# Patient Record
Sex: MALE | Employment: UNEMPLOYED | URBAN - METROPOLITAN AREA
[De-identification: names, ages, dates, MRNs, and addresses within clinical notes are randomized per-mention and may not be internally consistent; named-entity substitution may affect disease eponyms.]

---

## 2018-05-09 DIAGNOSIS — Z77.090 ASBESTOS EXPOSURE: Primary | ICD-10-CM

## 2018-05-09 DIAGNOSIS — R06.02 SOB (SHORTNESS OF BREATH): ICD-10-CM

## 2018-06-07 ENCOUNTER — PROCEDURE VISIT (OUTPATIENT)
Dept: PULMONOLOGY | Facility: CLINIC | Age: 73
End: 2018-06-07

## 2018-06-07 ENCOUNTER — OFFICE VISIT (OUTPATIENT)
Dept: PULMONOLOGY | Facility: CLINIC | Age: 73
End: 2018-06-07

## 2018-06-07 ENCOUNTER — HOSPITAL ENCOUNTER (OUTPATIENT)
Dept: RADIOLOGY | Facility: HOSPITAL | Age: 73
Discharge: HOME | End: 2018-06-07
Attending: INTERNAL MEDICINE

## 2018-06-07 VITALS
OXYGEN SATURATION: 98 % | HEART RATE: 97 BPM | DIASTOLIC BLOOD PRESSURE: 66 MMHG | WEIGHT: 215 LBS | HEIGHT: 72 IN | BODY MASS INDEX: 29.12 KG/M2 | SYSTOLIC BLOOD PRESSURE: 148 MMHG | RESPIRATION RATE: 18 BRPM

## 2018-06-07 DIAGNOSIS — Z77.090 ASBESTOS EXPOSURE: ICD-10-CM

## 2018-06-07 DIAGNOSIS — R06.02 SOB (SHORTNESS OF BREATH): ICD-10-CM

## 2018-06-07 DIAGNOSIS — R94.2 ABNORMAL PULMONARY FUNCTION TEST: ICD-10-CM

## 2018-06-07 DIAGNOSIS — Z77.090 ASBESTOS EXPOSURE: Primary | ICD-10-CM

## 2018-06-07 DIAGNOSIS — R93.89 ABNORMAL CXR (CHEST X-RAY): ICD-10-CM

## 2018-06-07 PROCEDURE — 94060 EVALUATION OF WHEEZING: CPT | Mod: ,,, | Performed by: INTERNAL MEDICINE

## 2018-06-07 PROCEDURE — 99205 OFFICE O/P NEW HI 60 MIN: CPT | Mod: 25,,, | Performed by: INTERNAL MEDICINE

## 2018-06-07 PROCEDURE — 94729 DIFFUSING CAPACITY: CPT | Mod: ,,, | Performed by: INTERNAL MEDICINE

## 2018-06-07 PROCEDURE — 71048 X-RAY EXAM CHEST 4+ VIEWS: CPT | Mod: 26,,, | Performed by: RADIOLOGY

## 2018-06-07 PROCEDURE — 71048 X-RAY EXAM CHEST 4+ VIEWS: CPT | Mod: TC

## 2018-06-07 PROCEDURE — 94726 PLETHYSMOGRAPHY LUNG VOLUMES: CPT | Mod: ,,, | Performed by: INTERNAL MEDICINE

## 2018-06-07 RX ORDER — WITCH HAZEL 50 %
2000 PADS, MEDICATED (EA) TOPICAL
COMMUNITY

## 2018-06-07 RX ORDER — MULTIVITAMIN
TABLET ORAL
COMMUNITY

## 2018-06-07 RX ORDER — VIT C/E/ZN/COPPR/LUTEIN/ZEAXAN 250MG-90MG
CAPSULE ORAL
COMMUNITY

## 2018-06-07 RX ORDER — ALBUTEROL SULFATE 90 UG/1
2 AEROSOL, METERED RESPIRATORY (INHALATION) EVERY 6 HOURS
Qty: 1 INHALER | Refills: 11 | Status: SHIPPED | OUTPATIENT
Start: 2018-06-07 | End: 2018-07-24 | Stop reason: SDUPTHER

## 2018-06-07 RX ORDER — ROSUVASTATIN CALCIUM 10 MG/1
10 TABLET, COATED ORAL
COMMUNITY

## 2018-06-07 NOTE — PROGRESS NOTES
Subjective:       Patient ID: Je Vidal is a 73 y.o. male.    Chief Complaint: He       No chief complaint on file.    HPI\    Dyspnea  Patient complains of shortness of breath. Symptoms occur while getting dressed, with one block walking. Symptoms began 5 years ago, gradually worsening since. Associated symptoms include  dry cough, dyspnea on exertion, shortness of breath and problems sleeping. He denies chest pain, located left chest. He does not have had recent travel. Weight has been stable. Symptoms are exacerbated by any exercise. Symptoms are alleviated by medication(s) (albuterol).     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     Social History Main Topics    Smoking status: Former Smoker     Packs/day: 1.00     Years: 24.00     Start date: 1/1/1960     Quit date: 1/1/1984    Smokeless tobacco: Current User     Types: Chew    Alcohol use Not on file    Drug use: Unknown    Sexual activity: Not on file     Other Topics Concern     Not on file     Social History Narrative    , wife lives in [place] with children ( 3)     Review of Systems   Constitutional: Positive for fatigue.   HENT: Negative.    Respiratory: Positive for shortness of breath and dyspnea on extertion.    Cardiovascular: Negative.    Genitourinary: Negative.    Endocrine: endocrine negative   Musculoskeletal: Positive for arthralgias and gait problem.   Skin: Negative.    Gastrointestinal: Negative.    Psychiatric/Behavioral: Negative.    All other systems reviewed and are negative.      Objective:      Physical Exam   Constitutional: He is oriented to person, place, and time. He appears well-developed and well-nourished.   Walks with a cane   HENT:   Head: Normocephalic and atraumatic.   Mouth/Throat: No oropharyngeal exudate.   Eyes: Conjunctivae are normal. Pupils are equal, round, and reactive to light.   Neck: Neck supple. No JVD present. No tracheal deviation present. No thyromegaly present.   Cardiovascular: Normal rate, regular rhythm and normal heart sounds.    No murmur heard.  Pulmonary/Chest: Effort normal. No respiratory distress. He has decreased breath sounds. He has no wheezes. He has no rhonchi. He has rales in the right lower field and the left lower field. He exhibits no tenderness.   Abdominal: Soft. Bowel sounds are normal.   Musculoskeletal: He exhibits no edema or tenderness.   Decreased range of motion of knees.  Decreased range of motion of  hip .     Lymphadenopathy:     He has no cervical adenopathy.   Neurological: He is alert and oriented to person, place, and time.   Skin: Skin is warm and dry.   Nursing note and vitals reviewed.    Personal Diagnostic Review  Chest x-ray: right lower lung field nodule vs atelectesis  Pulmonary function tests: mild restriction and mild reduction in DLCO    Office Spirometry Results:     No flowsheet data found.  Pulmonary Studies Review 6/7/2018   SpO2 98   Height 72.000   Weight 3440   BMI  (Calculated) 29.2   Predicted Distance 309.63   Predicted Distance Meters (Calculated) 532.43         Assessment:       1. Asbestos exposure    2. Abnormal pulmonary function test    3. SOB (shortness of breath)    4. Abnormal CXR (chest x-ray)        Outpatient Encounter Prescriptions as of 6/7/2018   Medication Sig Dispense Refill    cholecalciferol, vitamin D3, 1,000 unit capsule Take by mouth.      cyanocobalamin 2000 MCG tablet Take 2,000 mcg by mouth.      multivitamin with folic acid 400 mcg Tab Take by mouth.      rosuvastatin (CRESTOR) 10 MG tablet Take 10 mg by mouth.      albuterol 90 mcg/actuation inhaler Inhale 2 puffs into the lungs every 6 (six) hours. 1 Inhaler 11     No facility-administered encounter medications on file as of 6/7/2018.      Orders Placed This Encounter   Procedures    CT Chest Without Contrast     Standing Status:   Future     Standing Expiration Date:   6/7/2019     Order Specific Question:   May the Radiologist modify the order per protocol to meet the clinical needs of the patient?     Answer:   Yes     Plan:       Requested Prescriptions     Signed Prescriptions Disp Refills    albuterol 90 mcg/actuation inhaler 1 Inhaler 11     Sig: Inhale 2 puffs into the lungs every 6 (six) hours.     Asbestos exposure  -     CT Chest Without Contrast; Future; Expected date: 06/07/2018    Abnormal pulmonary function test  -     CT Chest Without Contrast; Future; Expected date: 06/07/2018    SOB (shortness of breath)  -     albuterol 90 mcg/actuation inhaler; Inhale 2 puffs into the lungs every 6 (six) hours.  Dispense: 1 Inhaler; Refill: 11    Abnormal CXR (chest x-ray)  Comments:  Right lower lung fields nipple shadow vs pleural plaque           Follow-up in about 4 weeks (around 7/5/2018) for Review CT/PET day of visit.    MEDICAL DECISION MAKING: Moderate to high complexity.  Overall, the multiple problems listed are of moderate to high severity that may impact quality of life and  activities of daily living. Side effects of medications, treatment plan as well as options and alternatives reviewed and discussed with patient. There was counseling of patient concerning these issues.    Total time spent in face to face counseling and coordination of care - 60  minutes over 50% of time was used in discussion of prognosis, risks, benefits of treatment, instructions and compliance with regimen . Discussion with other physicians or health care providers (DME, NP, pharmacy, respiratory therapy) occurred.

## 2018-06-09 LAB
BRPFT: ABNORMAL
DLCO ADJ PRE: 22.14 ML/(MIN*MMHG) (ref 21.19–35.05)
DLCO PRE: 22.14 ML/(MIN*MMHG) (ref 21.19–35.05)
DLCO SINGLE BREATH LLN: 21.19
DLCO SINGLE BREATH PRE REF: 78.7 %
DLCO SINGLE BREATH REF: 28.12
DLCOC SBVA LLN: 2.65
DLCOC SBVA PRE REF: 102.5 %
DLCOC SBVA REF: 3.73
DLCOC SINGLE BREATH LLN: 21.19
DLCOC SINGLE BREATH PRE REF: 78.7 %
DLCOC SINGLE BREATH REF: 28.12
DLCOVA LLN: 2.65
DLCOVA PRE REF: 102.5 %
DLCOVA PRE: 3.82 ML/(MIN*MMHG*L) (ref 2.65–4.81)
DLCOVA REF: 3.73
DLVAADJ PRE: 3.82 ML/(MIN*MMHG*L) (ref 2.65–4.81)
ERV LLN: 1.06
ERV PRE REF: 33.9 %
ERV PRE: 0.36 L (ref 1.06–1.06)
ERV REF: 1.06
ERVN2 LLN: 1.06
ERVN2 REF: 1.06
FEF 25 75 CHG: -29.3 %
FEF 25 75 LLN: 0.98
FEF 25 75 POST REF: 123.6 %
FEF 25 75 POST: 2.93 L/S (ref 0.98–3.77)
FEF 25 75 PRE REF: 175 %
FEF 25 75 PRE: 4.15 L/S (ref 0.98–3.77)
FEF 25 75 REF: 2.37
FET100 CHG: 231 %
FET100 POST: 7.68 SEC
FET100 PRE: 2.32 SEC
FEV1 CHG: -10.7 %
FEV1 FVC CHG: -12.2 %
FEV1 FVC LLN: 61
FEV1 FVC POST REF: 104.6 %
FEV1 FVC POST: 78.49 % (ref 61.17–88.95)
FEV1 FVC PRE REF: 119.1 %
FEV1 FVC PRE: 89.4 % (ref 61.17–88.95)
FEV1 FVC REF: 75
FEV1 LLN: 2.33
FEV1 POST REF: 81.9 %
FEV1 POST: 2.68 L (ref 2.33–4.22)
FEV1 PRE REF: 91.7 %
FEV1 PRE: 3 L (ref 2.33–4.22)
FEV1 REF: 3.27
FEV6 LLN: 3.4
FEV6 POST REF: 76 %
FEV6 POST: 3.31 L (ref 3.4–5.32)
FEV6 REF: 4.36
FRCN2 LLN: 2.87
FRCN2 REF: 3.86
FRCPL PRE: 3.11 L
FRCPLETH LLN: 2.87
FRCPLETH PREREF: 80.7 %
FRCPLETH REF: 3.86
FVC CHG: 1.7 %
FVC LLN: 3.22
FVC POST REF: 77.8 %
FVC POST: 3.42 L (ref 3.22–5.56)
FVC PRE REF: 76.5 %
FVC PRE: 3.36 L (ref 3.22–5.56)
FVC REF: 4.39
IVC PRE: 3.5 L (ref 3.22–5.56)
IVC SINGLE BREATH LLN: 3.22
IVC SINGLE BREATH PRE REF: 79.8 %
IVC SINGLE BREATH REF: 4.39
MVV LLN: 110
MVV REF: 129
PEF CHG: -34.6 %
PEF LLN: 5.96
PEF POST REF: 54.4 %
PEF POST: 4.58 L/S (ref 5.96–10.86)
PEF PRE REF: 83.2 %
PEF PRE: 6.99 L/S (ref 5.96–10.86)
PEF REF: 8.41
RAW LLN: 3.06
RAW PRE REF: 60 %
RAW PRE: 1.84 CMH2O*S/L (ref 3.06–3.06)
RAW REF: 3.06
RV LLN: 2.12
RV PRE REF: 80 %
RV PRE: 2.24 L (ref 2.12–3.47)
RV REF: 2.8
RVN2 LLN: 2.12
RVN2 REF: 2.8
RVN2TLCN2 LLN: 34
RVN2TLCN2 REF: 43
RVTLC LLN: 34
RVTLC PRE REF: 85.1 %
RVTLC PRE: 36.46 % (ref 33.84–51.8)
RVTLC REF: 43
TLC LLN: 6.39
TLC PRE REF: 81.3 %
TLC PRE: 6.13 L (ref 6.39–8.69)
TLC REF: 7.54
TLCN2 LLN: 6.39
TLCN2 REF: 7.54
VA PRE: 5.8 L (ref 7.39–7.39)
VA SINGLE BREATH LLN: 7.39
VA SINGLE BREATH PRE REF: 78.4 %
VA SINGLE BREATH REF: 7.39
VC LLN: 3.22
VC PRE REF: 88.7 %
VC PRE: 3.9 L (ref 3.22–5.56)
VC REF: 4.39
VCMAXN2 LLN: 3.22
VCMAXN2 REF: 4.39
VTGRAWPRE: 4.03 L

## 2018-07-24 ENCOUNTER — HOSPITAL ENCOUNTER (OUTPATIENT)
Dept: RADIOLOGY | Facility: HOSPITAL | Age: 73
Discharge: HOME | End: 2018-07-24
Attending: INTERNAL MEDICINE

## 2018-07-24 ENCOUNTER — OFFICE VISIT (OUTPATIENT)
Dept: PULMONOLOGY | Facility: CLINIC | Age: 73
End: 2018-07-24

## 2018-07-24 VITALS
OXYGEN SATURATION: 99 % | HEIGHT: 72 IN | DIASTOLIC BLOOD PRESSURE: 72 MMHG | WEIGHT: 211.06 LBS | BODY MASS INDEX: 28.59 KG/M2 | RESPIRATION RATE: 20 BRPM | HEART RATE: 98 BPM | SYSTOLIC BLOOD PRESSURE: 130 MMHG

## 2018-07-24 DIAGNOSIS — R94.2 ABNORMAL PULMONARY FUNCTION TEST: ICD-10-CM

## 2018-07-24 DIAGNOSIS — Z77.090 ASBESTOS EXPOSURE: Primary | ICD-10-CM

## 2018-07-24 DIAGNOSIS — Z77.090 ASBESTOS EXPOSURE: ICD-10-CM

## 2018-07-24 DIAGNOSIS — R06.02 SOB (SHORTNESS OF BREATH): ICD-10-CM

## 2018-07-24 DIAGNOSIS — J44.9 CHRONIC OBSTRUCTIVE PULMONARY DISEASE, UNSPECIFIED COPD TYPE: ICD-10-CM

## 2018-07-24 PROCEDURE — 99215 OFFICE O/P EST HI 40 MIN: CPT | Mod: 25,,, | Performed by: INTERNAL MEDICINE

## 2018-07-24 PROCEDURE — 71250 CT THORAX DX C-: CPT | Mod: TC

## 2018-07-24 RX ORDER — ALBUTEROL SULFATE 90 UG/1
2 AEROSOL, METERED RESPIRATORY (INHALATION) EVERY 6 HOURS
Qty: 1 INHALER | Refills: 11 | Status: SHIPPED | OUTPATIENT
Start: 2018-07-24 | End: 2019-07-24

## 2018-07-24 NOTE — PATIENT INSTRUCTIONS
Radiology Result      Name:   :  Patient MRN:   Je Vidal 1945 [id number]   Account Number: Room & Bed Accession Number:   [id number]   [id number]   Authorizing Physician: Patient Class: Diagnosis:   [facility] OP- Outpatient Diagnostic Testing Asbestos exposure [Z77.090 (ICD-10-CM)]  Abnormal pulmonary function test [R94.2 (ICD-10-CM)]   Procedure:  Exam Date: Reason for Exam:   CT Chest Without Contrast 2018 Asbestos exposure, interstitial lung dz suspected             RESULTS:  EXAMINATION:  CT CHEST WITHOUT CONTRAST     CLINICAL HISTORY:  Asbestos exposure, interstitial lung dz suspected;     TECHNIQUE:  Standard chest CT protocol was performed without IV contrast.     COMPARISON:  A chest x-ray performed on 2018.     FINDINGS:  The size of heart is within normal limits.  There is a mild amount of atherosclerosis.  There are calcified mediastinal and left perihilar lymph nodes.  There are minimal dependent atelectatic changes in both lungs.  There is no pneumothorax or pleural effusion.  There is diffuse fatty infiltration of the liver.  There are several small stones in the dependent portion of the gallbladder.  There are findings characteristic of hemangiomas in the T3 and T12 vertebral bodies.     IMPRESSION:      1. There is no radiographic evidence of prior exposure to asbestos.  2. There are calcified mediastinal and left perihilar lymph nodes. There are minimal dependent atelectatic changes in both lungs.  3. There are several small stones in the dependent portion of the gallbladder.  4. There is diffuse fatty infiltration of the liver.  5. There are findings characteristic of hemangiomas in the T3 and T12 vertebral bodies.  All CT scans at this facility use dose modulation, iterative reconstruction, and/or weight base dosing when appropriate to reduce radiation dose when appropriate to reduce radiation dose to as low as reasonably achievable.        Electronically signed by:  Kashif Rivera MD  Date:                                            07/24/2018  Time:                                           10:10                    Signed By:  FALGUNI Rivera Sr., MD on 7/24/2018 10:10 AM           Albuterol inhalation aerosol  What is this medicine?  ALBUTEROL (al BYOO ter ole) is a bronchodilator. It helps open up the airways in your lungs to make it easier to breathe. This medicine is used to treat and to prevent bronchospasm.  How should I use this medicine?  This medicine is for inhalation through the mouth. Follow the directions on your prescription label. Take your medicine at regular intervals. Do not use more often than directed. Make sure that you are using your inhaler correctly. Ask you doctor or health care provider if you have any questions.  Talk to your pediatrician regarding the use of this medicine in children. Special care may be needed.  What side effects may I notice from receiving this medicine?  Side effects that you should report to your doctor or health care professional as soon as possible:  · allergic reactions like skin rash, itching or hives, swelling of the face, lips, or tongue  · breathing problems  · chest pain  · feeling faint or lightheaded, falls  · high blood pressure  · irregular heartbeat  · fever  · muscle cramps or weakness  · pain, tingling, numbness in the hands or feet  · vomiting  Side effects that usually do not require medical attention (report to your doctor or health care professional if they continue or are bothersome):  · cough  · difficulty sleeping  · headache  · nervousness or trembling  · stomach upset  · stuffy or runny nose  · throat irritation  · unusual taste  What may interact with this medicine?  · anti-infectives like chloroquine and pentamidine  · caffeine  · cisapride  · diuretics  · medicines for colds  · medicines for depression or for emotional or psychotic conditions  · medicines for weight loss including some herbal  products  · methadone  · some antibiotics like clarithromycin, erythromycin, levofloxacin, and linezolid  · some heart medicines  · steroid hormones like dexamethasone, cortisone, hydrocortisone  · theophylline  · thyroid hormones  What if I miss a dose?  If you miss a dose, use it as soon as you can. If it is almost time for your next dose, use only that dose. Do not use double or extra doses.  Where should I keep my medicine?  Keep out of the reach of children.  Store at room temperature between 15 and 30 degrees C (59 and 86 degrees F). The contents are under pressure and may burst when exposed to heat or flame. Do not freeze. This medicine does not work as well if it is too cold. Throw away any unused medicine after the expiration date. Inhalers need to be thrown away after the labeled number of puffs have been used or by the expiration date; whichever comes first. Ventolin HFA should be thrown away 12 months after removing from foil pouch. Check the instructions that come with your medicine.  What should I tell my health care provider before I take this medicine?  They need to know if you have any of the following conditions:  · diabetes  · heart disease or irregular heartbeat  · high blood pressure  · pheochromocytoma  · seizures  · thyroid disease  · an unusual or allergic reaction to albuterol, levalbuterol, sulfites, other medicines, foods, dyes, or preservatives  · pregnant or trying to get pregnant  · breast-feeding  What should I watch for while using this medicine?  Tell your doctor or health care professional if your symptoms do not improve. Do not use extra albuterol. If your asthma or bronchitis gets worse while you are using this medicine, call your doctor right away.  If your mouth gets dry try chewing sugarless gum or sucking hard candy. Drink water as directed.  NOTE:This sheet is a summary. It may not cover all possible information. If you have questions about this medicine, talk to your doctor,  pharmacist, or health care provider. Copyright© 2017 [employer]        What are Gallstones?    The gallbladder stores bile, a fluid made by the liver. Bile helps digest fats in the foods you eat. Gallstones form when certain substances in the bile crystallize and become solid. In some cases, the stones dont cause any symptoms. In others, they irritate the walls of the gallbladder. More serious problems can occur if stones move into nearby ducts (such as the common bile duct) and cause blockages. This can block the flow of bile and lead to pain, nausea, and infection.  Common symptoms  Gallbladder problems can cause painful attacks, often after a meal. Some people have only one attack. Others have many. Common symptoms include:  · Severe, steady pain or aching in the upper right belly (abdomen), back, or right shoulder blade, lasting from 30 minutes to several hours  · A dull ache beneath the ribs or breastbone  · Nausea, upset stomach, or vomiting  · A buildup of too much bile in the blood (jaundice), which causes yellowing of the skin and eyes, dark urine, and itching. Call your healthcare provider right away if this occurs.  Risk factors for gallstones  You are more at risk for gallstones if you:  · Are a woman  · Are obese  · Have a history of very fast (rapid) weight loss  · Have certain intestinal diseases, such as Crohns disease  · Have certain blood diseases, such as sickle cell anemia  · Have a family background that includes Native Americans or [place] Americans  Diagnosis  Ultrasound is often used to look at the gallbladder and measure the size and exact location of gallstone.  Blood tests are used to measure liver enzymes and bilirubin. Both of these may be high if the gallstones are blocking bile flow or irritating the liver.  Treating gallstones  If your stones are not causing symptoms, you may choose to delay treatment. But if youve had one or more painful attacks, your healthcare provider will  likely recommend removing your gallbladder. This prevents more stones from forming and causing attacks. It also helps prevent problems, such as stones passing into the ducts and causing infection or pancreatitis. After the gallbladder is removed, your liver will still make bile to aid digestion.  If youre pregnant  Hormone changes during pregnancy can make bile more likely to form stones. If your gallbladder needs to be removed, your doctor will talk with you about the timing for surgery. In some cases, it can be delayed until after childbirth. In others, you may have surgery during pregnancy. This helps protect you and your babys health.   Date Last Reviewed: 12/1/2016  © 0795-8909 [employer] [address] [place] [address], [place] [address]. All rights reserved. This information is not intended as a substitute for professional medical care. Always follow your healthcare professional's instructions.

## 2018-07-24 NOTE — LETTER
2018    Je Vidal  [address]  [facility] [address]       [facility]  [address] [place] [address]  Phone: [phone]  Fax: [phone] Dear Mr. Vidal:    Enclosed is a copy or your CT scan report:  Radiology Result      Name:   :  Patient MRN:   Je Vidal 1945 [id number]   Account Number: Room & Bed Accession Number:   [id number]   [id number]   Authorizing Physician: Patient Class: Diagnosis:   [facility] Diopmes OP- Outpatient Diagnostic Testing Asbestos exposure [Z77.090 (ICD-10-CM)]  Abnormal pulmonary function test [R94.2 (ICD-10-CM)]   Procedure:  Exam Date: Reason for Exam:   CT Chest Without Contrast 2018 Asbestos exposure, interstitial lung dz suspected             RESULTS:  EXAMINATION:  CT CHEST WITHOUT CONTRAST     CLINICAL HISTORY:  Asbestos exposure, interstitial lung dz suspected;     TECHNIQUE:  Standard chest CT protocol was performed without IV contrast.     COMPARISON:  A chest x-ray performed on 2018.     FINDINGS:  The size of heart is within normal limits.  There is a mild amount of atherosclerosis.  There are calcified mediastinal and left perihilar lymph nodes.  There are minimal dependent atelectatic changes in both lungs.  There is no pneumothorax or pleural effusion.  There is diffuse fatty infiltration of the liver.  There are several small stones in the dependent portion of the gallbladder.  There are findings characteristic of hemangiomas in the T3 and T12 vertebral bodies.     IMPRESSION:      1. There is no radiographic evidence of prior exposure to asbestos.  2. There are calcified mediastinal and left perihilar lymph nodes. There are minimal dependent atelectatic changes in both lungs.  3. There are several small stones in the dependent portion of the gallbladder.  4. There is diffuse fatty infiltration of the liver.  5. There are findings characteristic of hemangiomas in the T3 and T12 vertebral bodies.  All  CT scans at this facility use dose modulation, iterative reconstruction, and/or weight base dosing when appropriate to reduce radiation dose when appropriate to reduce radiation dose to as low as reasonably achievable.        Electronically signed by: Kashif Rivera MD  Date:                                            07/24/2018  Time:                                           10:10                    Signed By:  FALGUNI Rivera Sr., MD on 7/24/2018 10:10 AM        If you have any questions or concerns, please don't hesitate to call.    Sincerely,        Que Payne MD

## 2018-07-24 NOTE — PROGRESS NOTES
Subjective:       Patient ID: Je Vidal is a 73 y.o. male.    Chief Complaint: He       Follow-up (review CT)    HPI       Dyspnea  Patient complains of shortness of breath. Symptoms occur after more than one flight stairs, with more than one block walking. Symptoms began 5 years ago, gradually worsening since. Associated symptoms include  shortness of breath. He denies chest pain, located left chest. He does not have had recent travel. Weight has been stable. Symptoms are exacerbated by moderate activity. Symptoms are alleviated by rest.     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     Social History Main Topics    Smoking status: Former Smoker     Packs/day: 1.00     Years: 24.00     Start date: 1/1/1960     Quit date: 1/1/1984    Smokeless tobacco: Current User     Types: Chew    Alcohol use Not on file    Drug use: Unknown    Sexual activity: Not on file     Other Topics Concern    Not on file     Social History Narrative    , wife lives in [place] with children ( 3)     Review of Systems   Constitutional: Negative for fever and fatigue.   HENT: Negative for postnasal drip and rhinorrhea.     Eyes: Negative for redness and itching.   Respiratory: Positive for shortness of breath and dyspnea on extertion. Negative for cough, wheezing and Paroxysmal Nocturnal Dyspnea.    Cardiovascular: Negative for chest pain.   Genitourinary: Negative for difficulty urinating and hematuria.   Endocrine: Negative for polyphagia, cold intolerance and heat intolerance.    Musculoskeletal: Negative for arthralgias.   Skin: Negative for rash.   Gastrointestinal: Negative for nausea, vomiting, abdominal pain and abdominal distention.   Neurological: Negative for dizziness and headaches.   Hematological: Negative for adenopathy. Does not bruise/bleed easily and no excessive bruising.   Psychiatric/Behavioral: Positive for sleep disturbance. The patient is not nervous/anxious.        Objective:      Physical Exam   Constitutional: He is oriented to person, place, and time. He appears well-developed and well-nourished.   HENT:   Head: Normocephalic and atraumatic.   Mouth/Throat: Oropharyngeal exudate present.   Eyes: Conjunctivae are normal. Pupils are equal, round, and reactive to light.   Neck: Neck supple. No JVD present. No tracheal deviation present. No thyromegaly present.   Cardiovascular: Normal rate, regular rhythm and normal heart sounds.    No murmur heard.  Pulmonary/Chest: Effort normal. No respiratory distress. He has decreased breath sounds. He has no wheezes. He has no rhonchi. He has rales in the right lower field and the left lower field. He exhibits no tenderness.   Abdominal: Soft. Bowel sounds are normal.   Musculoskeletal: Normal range of motion. He exhibits no edema or tenderness.   Lymphadenopathy:     He has no cervical adenopathy.   Neurological: He is alert and oriented to person, place, and time.   Skin: Skin is warm and dry.   Nursing note and vitals reviewed.    Personal Diagnostic Review    Radiology Result      Name:   :  Patient MRN:   Je Vidal 1945 [id number]   Account Number: Room &  Bed Accession Number:   [id number]   [id number]   Authorizing Physician: Patient Class: Diagnosis:   Que Payne OP- Outpatient Diagnostic Testing Asbestos exposure [Z77.090 (ICD-10-CM)]  Abnormal pulmonary function test [R94.2 (ICD-10-CM)]   Procedure:  Exam Date: Reason for Exam:   CT Chest Without Contrast 07/24/2018 Asbestos exposure, interstitial lung dz suspected             RESULTS:  EXAMINATION:  CT CHEST WITHOUT CONTRAST     CLINICAL HISTORY:  Asbestos exposure, interstitial lung dz suspected;     TECHNIQUE:  Standard chest CT protocol was performed without IV contrast.     COMPARISON:  A chest x-ray performed on 06/07/2018.     FINDINGS:  The size of heart is within normal limits.  There is a mild amount of atherosclerosis.  There are calcified mediastinal and left perihilar lymph nodes.  There are minimal dependent atelectatic changes in both lungs.  There is no pneumothorax or pleural effusion.  There is diffuse fatty infiltration of the liver.  There are several small stones in the dependent portion of the gallbladder.  There are findings characteristic of hemangiomas in the T3 and T12 vertebral bodies.     IMPRESSION:      1. There is no radiographic evidence of prior exposure to asbestos.  2. There are calcified mediastinal and left perihilar lymph nodes. There are minimal dependent atelectatic changes in both lungs.  3. There are several small stones in the dependent portion of the gallbladder.  4. There is diffuse fatty infiltration of the liver.  5. There are findings characteristic of hemangiomas in the T3 and T12 vertebral bodies.  All CT scans at this facility use dose modulation, iterative reconstruction, and/or weight base dosing when appropriate to reduce radiation dose when appropriate to reduce radiation dose to as low as reasonably achievable.        Electronically signed by: Kashif Rivera MD  Date:                                            07/24/2018  Time:                                            10:10                    Signed By:  FALGUNI Rivear Sr., MD on 2018 10:10 AM             CT of chest performed on 2018 without contrast revealed nonspecific findings.    Radiology Result      Name:   :  Patient MRN:   Je Vidal 1945 [id number]   Account Number: Room & Bed Accession Number:   [id number]   [id number]   Authorizing Physician: Patient Class: Diagnosis:   [facility]op[facility] OP- Outpatient Diagnostic Testing Asbestos exposure [Z77.090 (ICD-10-CM)]  Abnormal pulmonary function test [R94.2 (ICD-10-CM)]   Procedure:  Exam Date: Reason for Exam:   CT Chest Without Contrast 2018 Asbestos exposure, interstitial lung dz suspected             RESULTS:  EXAMINATION:  CT CHEST WITHOUT CONTRAST     CLINICAL HISTORY:  Asbestos exposure, interstitial lung dz suspected;     TECHNIQUE:  Standard chest CT protocol was performed without IV contrast.     COMPARISON:  A chest x-ray performed on 2018.     FINDINGS:  The size of heart is within normal limits.  There is a mild amount of atherosclerosis.  There are calcified mediastinal and left perihilar lymph nodes.  There are minimal dependent atelectatic changes in both lungs.  There is no pneumothorax or pleural effusion.  There is diffuse fatty infiltration of the liver.  There are several small stones in the dependent portion of the gallbladder.  There are findings characteristic of hemangiomas in the T3 and T12 vertebral bodies.     IMPRESSION:      1. There is no radiographic evidence of prior exposure to asbestos.  2. There are calcified mediastinal and left perihilar lymph nodes. There are minimal dependent atelectatic changes in both lungs.  3. There are several small stones in the dependent portion of the gallbladder.  4. There is diffuse fatty infiltration of the liver.  5. There are findings characteristic of hemangiomas in the T3 and T12 vertebral bodies.  All CT scans at this facility use dose modulation, iterative  reconstruction, and/or weight base dosing when appropriate to reduce radiation dose when appropriate to reduce radiation dose to as low as reasonably achievable.        Electronically signed by: Ksahif Rivera MD  Date:                                            07/24/2018  Time:                                           10:10                    Signed By:  FALGUNI Rivera Sr., MD on 7/24/2018 10:10 AM             Office Spirometry Results:     No flowsheet data found.  Pulmonary Studies Review 7/24/2018   SpO2 99   Height 72.000   Weight 3377.45   BMI (Calculated) 28.7   Predicted Distance 312.43   Predicted Distance Meters (Calculated) 535.55         Assessment:       1. Asbestos exposure    2. SOB (shortness of breath)    3. Chronic obstructive pulmonary disease, unspecified COPD type        Outpatient Encounter Prescriptions as of 7/24/2018   Medication Sig Dispense Refill    albuterol 90 mcg/actuation inhaler Inhale 2 puffs into the lungs every 6 (six) hours. 1 Inhaler 11    cholecalciferol, vitamin D3, 1,000 unit capsule Take by mouth.      cyanocobalamin 2000 MCG tablet Take 2,000 mcg by mouth.      multivitamin with folic acid 400 mcg Tab Take by mouth.      rosuvastatin (CRESTOR) 10 MG tablet Take 10 mg by mouth.      [DISCONTINUED] albuterol 90 mcg/actuation inhaler Inhale 2 puffs into the lungs every 6 (six) hours. 1 Inhaler 11     No facility-administered encounter medications on file as of 7/24/2018.      Orders Placed This Encounter   Procedures    X-Ray Chest 4 Or More View     Standing Status:   Future     Standing Expiration Date:   7/25/2019     Order Specific Question:   Reason for Exam:     Answer:   asbestosis    Spirometry with/without bronchodilator     Standing Status:   Future     Standing Expiration Date:   7/24/2019     Plan:       Requested Prescriptions     Signed Prescriptions Disp Refills    albuterol 90 mcg/actuation inhaler 1 Inhaler 11     Sig: Inhale 2 puffs into the  lungs every 6 (six) hours.     Asbestos exposure  -     X-Ray Chest 4 Or More View; Future; Expected date: 07/24/2018  -     Spirometry with/without bronchodilator; Future; Expected date: 01/24/2019    SOB (shortness of breath)  -     albuterol 90 mcg/actuation inhaler; Inhale 2 puffs into the lungs every 6 (six) hours.  Dispense: 1 Inhaler; Refill: 11  -     X-Ray Chest 4 Or More View; Future; Expected date: 07/24/2018  -     Spirometry with/without bronchodilator; Future; Expected date: 01/24/2019    Chronic obstructive pulmonary disease, unspecified COPD type  -     X-Ray Chest 4 Or More View; Future; Expected date: 07/24/2018  -     Spirometry with/without bronchodilator; Future; Expected date: 01/24/2019           Follow-up in about 1 year (around 7/24/2019) for Review shima and CXR.    MEDICAL DECISION MAKING: Moderate to high complexity.  Overall, the multiple problems listed are of moderate to high severity that may impact quality of life and activities of daily living. Side effects of medications, treatment plan as well as options and alternatives reviewed and discussed with patient. There was counseling of patient concerning these issues.    Total time spent in face to face counseling and coordination of care - 60  minutes over 50% of time was used in discussion of prognosis, risks, benefits of treatment, instructions and compliance with regimen . Discussion with other physicians or health care providers (DME, NP, pharmacy, respiratory therapy) occurred.

## 2019-07-08 ENCOUNTER — TELEPHONE (OUTPATIENT)
Dept: PULMONOLOGY | Facility: CLINIC | Age: 74
End: 2019-07-08

## 2019-07-08 NOTE — TELEPHONE ENCOUNTER
Called patient to notify of appointment rescheduled with Dr Payne on 8/20/19. Patient agreed with day and time. Patient aware of time between testing and appointment and is okay with this. Appointment reminder mailed to patient.